# Patient Record
Sex: MALE | Race: ASIAN | NOT HISPANIC OR LATINO | URBAN - METROPOLITAN AREA
[De-identification: names, ages, dates, MRNs, and addresses within clinical notes are randomized per-mention and may not be internally consistent; named-entity substitution may affect disease eponyms.]

---

## 2017-04-12 ENCOUNTER — INPATIENT (INPATIENT)
Facility: HOSPITAL | Age: 57
LOS: 0 days | Discharge: HOME | DRG: 313 | End: 2017-04-13
Attending: SPECIALIST | Admitting: SPECIALIST
Payer: COMMERCIAL

## 2017-04-12 VITALS
TEMPERATURE: 98 F | WEIGHT: 149.91 LBS | HEART RATE: 70 BPM | RESPIRATION RATE: 18 BRPM | SYSTOLIC BLOOD PRESSURE: 143 MMHG | OXYGEN SATURATION: 98 % | HEIGHT: 67 IN | DIASTOLIC BLOOD PRESSURE: 84 MMHG

## 2017-04-12 DIAGNOSIS — I10 ESSENTIAL (PRIMARY) HYPERTENSION: ICD-10-CM

## 2017-04-12 DIAGNOSIS — E11.9 TYPE 2 DIABETES MELLITUS WITHOUT COMPLICATIONS: ICD-10-CM

## 2017-04-12 DIAGNOSIS — R07.89 OTHER CHEST PAIN: ICD-10-CM

## 2017-04-12 LAB
ALBUMIN SERPL ELPH-MCNC: 4 G/DL — SIGNIFICANT CHANGE UP (ref 3.4–5)
ALP SERPL-CCNC: 77 U/L — SIGNIFICANT CHANGE UP (ref 40–120)
ALT FLD-CCNC: 46 U/L — HIGH (ref 12–42)
ANION GAP SERPL CALC-SCNC: 10 MMOL/L — SIGNIFICANT CHANGE UP (ref 9–16)
APTT BLD: 34.8 SEC — SIGNIFICANT CHANGE UP (ref 27.5–37.4)
AST SERPL-CCNC: 21 U/L — SIGNIFICANT CHANGE UP (ref 15–37)
BASOPHILS NFR BLD AUTO: 0.5 % — SIGNIFICANT CHANGE UP (ref 0–2)
BILIRUB SERPL-MCNC: 0.7 MG/DL — SIGNIFICANT CHANGE UP (ref 0.2–1.2)
BUN SERPL-MCNC: 19 MG/DL — SIGNIFICANT CHANGE UP (ref 7–23)
CALCIUM SERPL-MCNC: 8.4 MG/DL — LOW (ref 8.5–10.5)
CHLORIDE SERPL-SCNC: 104 MMOL/L — SIGNIFICANT CHANGE UP (ref 96–108)
CK MB CFR SERPL CALC: 1.1 NG/ML — SIGNIFICANT CHANGE UP (ref 0.5–3.6)
CK SERPL-CCNC: 166 U/L — SIGNIFICANT CHANGE UP (ref 39–308)
CO2 SERPL-SCNC: 25 MMOL/L — SIGNIFICANT CHANGE UP (ref 22–31)
CREAT SERPL-MCNC: 0.87 MG/DL — SIGNIFICANT CHANGE UP (ref 0.5–1.3)
EOSINOPHIL NFR BLD AUTO: 1.9 % — SIGNIFICANT CHANGE UP (ref 0–6)
GLUCOSE SERPL-MCNC: 135 MG/DL — HIGH (ref 70–99)
HCT VFR BLD CALC: 47 % — SIGNIFICANT CHANGE UP (ref 39–50)
HGB BLD-MCNC: 16.1 G/DL — SIGNIFICANT CHANGE UP (ref 13–17)
INR BLD: 0.95 — SIGNIFICANT CHANGE UP (ref 0.88–1.16)
LYMPHOCYTES # BLD AUTO: 32.3 % — SIGNIFICANT CHANGE UP (ref 13–44)
MCHC RBC-ENTMCNC: 28.8 PG — SIGNIFICANT CHANGE UP (ref 27–34)
MCHC RBC-ENTMCNC: 34.3 G/DL — SIGNIFICANT CHANGE UP (ref 32–36)
MCV RBC AUTO: 83.9 FL — SIGNIFICANT CHANGE UP (ref 80–100)
MONOCYTES NFR BLD AUTO: 6.1 % — SIGNIFICANT CHANGE UP (ref 2–14)
NEUTROPHILS NFR BLD AUTO: 59.2 % — SIGNIFICANT CHANGE UP (ref 43–77)
PLATELET # BLD AUTO: 194 K/UL — SIGNIFICANT CHANGE UP (ref 150–400)
POTASSIUM SERPL-MCNC: 3.7 MMOL/L — SIGNIFICANT CHANGE UP (ref 3.5–5.3)
POTASSIUM SERPL-SCNC: 3.7 MMOL/L — SIGNIFICANT CHANGE UP (ref 3.5–5.3)
PROT SERPL-MCNC: 7.6 G/DL — SIGNIFICANT CHANGE UP (ref 6.4–8.2)
PROTHROM AB SERPL-ACNC: 10.5 SEC — SIGNIFICANT CHANGE UP (ref 9.8–12.7)
RBC # BLD: 5.6 M/UL — SIGNIFICANT CHANGE UP (ref 4.2–5.8)
RBC # FLD: 13.5 % — SIGNIFICANT CHANGE UP (ref 10.3–16.9)
SODIUM SERPL-SCNC: 139 MMOL/L — SIGNIFICANT CHANGE UP (ref 135–145)
TROPONIN I SERPL-MCNC: <0.015 NG/ML — SIGNIFICANT CHANGE UP (ref 0.01–0.04)
WBC # BLD: 10.1 K/UL — SIGNIFICANT CHANGE UP (ref 3.8–10.5)
WBC # FLD AUTO: 10.1 K/UL — SIGNIFICANT CHANGE UP (ref 3.8–10.5)

## 2017-04-12 PROCEDURE — 93010 ELECTROCARDIOGRAM REPORT: CPT | Mod: NC

## 2017-04-12 PROCEDURE — 99284 EMERGENCY DEPT VISIT MOD MDM: CPT | Mod: 25

## 2017-04-12 PROCEDURE — 71020: CPT | Mod: 26

## 2017-04-12 RX ORDER — DEXTROSE 50 % IN WATER 50 %
12.5 SYRINGE (ML) INTRAVENOUS ONCE
Qty: 0 | Refills: 0 | Status: DISCONTINUED | OUTPATIENT
Start: 2017-04-12 | End: 2017-04-13

## 2017-04-12 RX ORDER — NEBIVOLOL HYDROCHLORIDE 5 MG/1
1 TABLET ORAL
Qty: 0 | Refills: 0 | COMMUNITY

## 2017-04-12 RX ORDER — SODIUM CHLORIDE 9 MG/ML
1000 INJECTION INTRAMUSCULAR; INTRAVENOUS; SUBCUTANEOUS
Qty: 0 | Refills: 0 | Status: DISCONTINUED | OUTPATIENT
Start: 2017-04-13 | End: 2017-04-13

## 2017-04-12 RX ORDER — INSULIN LISPRO 100/ML
VIAL (ML) SUBCUTANEOUS
Qty: 0 | Refills: 0 | Status: DISCONTINUED | OUTPATIENT
Start: 2017-04-12 | End: 2017-04-13

## 2017-04-12 RX ORDER — ASPIRIN/CALCIUM CARB/MAGNESIUM 324 MG
81 TABLET ORAL ONCE
Qty: 0 | Refills: 0 | Status: COMPLETED | OUTPATIENT
Start: 2017-04-13 | End: 2017-04-13

## 2017-04-12 RX ORDER — EMPAGLIFLOZIN AND LINAGLIPTIN 10; 5 MG/1; MG/1
1 TABLET, FILM COATED ORAL
Qty: 0 | Refills: 0 | COMMUNITY

## 2017-04-12 RX ORDER — SODIUM CHLORIDE 9 MG/ML
1000 INJECTION, SOLUTION INTRAVENOUS
Qty: 0 | Refills: 0 | Status: DISCONTINUED | OUTPATIENT
Start: 2017-04-12 | End: 2017-04-13

## 2017-04-12 RX ORDER — DEXTROSE 50 % IN WATER 50 %
25 SYRINGE (ML) INTRAVENOUS ONCE
Qty: 0 | Refills: 0 | Status: DISCONTINUED | OUTPATIENT
Start: 2017-04-12 | End: 2017-04-13

## 2017-04-12 RX ORDER — METFORMIN HYDROCHLORIDE 850 MG/1
1 TABLET ORAL
Qty: 0 | Refills: 0 | COMMUNITY

## 2017-04-12 RX ORDER — DEXTROSE 50 % IN WATER 50 %
1 SYRINGE (ML) INTRAVENOUS ONCE
Qty: 0 | Refills: 0 | Status: DISCONTINUED | OUTPATIENT
Start: 2017-04-12 | End: 2017-04-13

## 2017-04-12 RX ORDER — GLUCAGON INJECTION, SOLUTION 0.5 MG/.1ML
1 INJECTION, SOLUTION SUBCUTANEOUS ONCE
Qty: 0 | Refills: 0 | Status: DISCONTINUED | OUTPATIENT
Start: 2017-04-12 | End: 2017-04-13

## 2017-04-12 RX ORDER — ASPIRIN/CALCIUM CARB/MAGNESIUM 324 MG
162 TABLET ORAL DAILY
Qty: 0 | Refills: 0 | Status: DISCONTINUED | OUTPATIENT
Start: 2017-04-12 | End: 2017-04-12

## 2017-04-12 RX ORDER — NEBIVOLOL HYDROCHLORIDE 5 MG/1
10 TABLET ORAL DAILY
Qty: 0 | Refills: 0 | Status: DISCONTINUED | OUTPATIENT
Start: 2017-04-12 | End: 2017-04-13

## 2017-04-12 RX ADMIN — Medication 162 MILLIGRAM(S): at 18:13

## 2017-04-12 NOTE — H&P ADULT - PROBLEM SELECTOR PLAN 1
Follow-up CE at 10pm and 2am.   NPO after midnight for CTA Coronaries.   Echo ordered.   Follow-up TSH and lipid panel.   ASA 81 mg tomorrow AM prior to CTA.

## 2017-04-12 NOTE — ED PROVIDER NOTE - FAMILY HISTORY
Father  Still living? Unknown  Family history of stroke, Age at diagnosis: Age Unknown     Sibling  Still living? Unknown  Family history of stroke, Age at diagnosis: Age Unknown

## 2017-04-12 NOTE — H&P ADULT - NSHPLABSRESULTS_GEN_ALL_CORE
16.1   10.1  )-----------( 194      ( 12 Apr 2017 17:04 )             47.0   04-12    139  |  104  |  19  ----------------------------<  135<H>  3.7   |  25  |  0.87    Ca    8.4<L>      12 Apr 2017 17:04    TPro  7.6  /  Alb  4.0  /  TBili  0.7  /  DBili  x   /  AST  21  /  ALT  46<H>  /  AlkPhos  77  04-12    CARDIAC MARKERS ( 12 Apr 2017 17:04 )  <0.015 ng/mL / x     / 166 U/L / x     / 1.1 ng/mL  PT/INR - ( 12 Apr 2017 17:04 )   PT: 10.5 sec;   INR: 0.95          PTT - ( 12 Apr 2017 17:04 )  PTT:34.8 sec

## 2017-04-12 NOTE — H&P ADULT - NSHPPHYSICALEXAM_GEN_ALL_CORE
VS: /84, HR 70, RR 18, T 98.2, SpO2 98% RA	    GEN: NAD  PULM:  CTA B/L, No Crackles, Wheezes or Rhonchi  CARD: No JVD B/L, RRR, S1 and S2   ABD: +BS, NT, soft/ND	  EXT: No Edema B/L LE  NEURO: A+Ox3, no focal deficit

## 2017-04-12 NOTE — H&P ADULT - PROBLEM SELECTOR PLAN 3
Hold Metformin.   JAMI.   Hemoglobin A1C.     DVT ppx  Intermittent pneumatic compression device.     Dispo: NPO after midnight for CTA Coronaries in the AM.

## 2017-04-12 NOTE — H&P ADULT - NSHPREVIEWOFSYSTEMS_GEN_ALL_CORE
GEN Denies fever, chills, sweats, weakness/fatigue  SKIN Denies rash, pruritis, lesions/sores   HEENT Denies HA, dizziness, lightheaded, vertigo, vision changes, hearing changes  LUNG Denies cough, sputum, wheeze, hemoptysis, pleurisy  CV  +C/P, Denies MAN, palpitations, orthopnea, PND, claudication, syncope  GI Denies abdominal pain, dysphagia, GERD, N/V/D, constipation, melena, BRBPR, hematemesis   URINARY Denies polyuria, dysuria, hematuria, 		  MSK Denies joint pains, joint swelling, falls, gait abnl, limited ROM  NEUROLOGY Denies fainting, seizures, weakness, numbness/tingling, tremor, speech changes  PSYCH Denies depression, anxiety, hallucinations, suicidal ideation

## 2017-04-12 NOTE — H&P ADULT - ASSESSMENT
57 y/o M with PMHx of HTN, DM, PUD (w/ history of GI bleed 12 years ago treated with EGD, no history of GI bleeds since) who presented to [facility] ER 4/12/17 with atypical chest pain x 1 week. Patient is currently asymptomatic with VSS. Labs significant for CE negative x 1. Pt received ASA 162mg in ER. Pt admitted to [facility] for further management.

## 2017-04-12 NOTE — ED ADULT NURSE NOTE - CHPI ED SYMPTOMS NEG
no syncope/no shortness of breath/no vomiting/no diaphoresis/no fever/no chills/no back pain/abd pain/no cough/no dizziness/no nausea

## 2017-04-12 NOTE — ED PROVIDER NOTE - PMH
Essential hypertension    Type 2 diabetes mellitus without complication, without long-term current use of insulin

## 2017-04-12 NOTE — ED PROVIDER NOTE - OBJECTIVE STATEMENT
57 yo M with PMHx of HTN and DMII presents with one week of chest pain. Pt states that he has been having a dull pain near his left armpit, that comes and goes, for the past week. Pt states that the pain is 2-3-10 on VAS. Pt states that he has only experienced a similar pain, once last year, but it was only one time and then it went away. Pt states that he went to see his PCP today and she sent him to the [facility] ED. Pt denies any n/v/f/c/SOB/dizziness/HA/ visual changes.

## 2017-04-12 NOTE — CONSULT NOTE ADULT - SUBJECTIVE AND OBJECTIVE BOX
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

## 2017-04-12 NOTE — H&P ADULT - HISTORY OF PRESENT ILLNESS
55 y/o M with PMHx of HTN, DM, PUD (w/ history of GI bleed 12 years ago treated with EGD, no history of GI bleeds since) who presented to [facility] ER 4/12/17 with atypical chest pain x 1 week. Pt states that last Tuesday 4/4/17 he woke up from sleep at 3am with non-radiating left sided axillary pain 2-3/10 in severity that lasted for a few minutes. The next morning the patient went to work and experienced the same pain around 11am which he describes as "pressure like someone is holding his chest".  On Monday, patient reported intermittent, non-radiating left-sided chest pain. Patient was sent from PMD to [facility] ER. Patient is currently asymptomatic with VSS /84, HR 70, RR 18, T 98.2, SpO2 98% RA. Pt denies SOB, dizziness, diaphoresis, fatigue, LE edema, palpitations, orthopnea, PND, syncope. Labs significant for CE negative x 1. EKG showed NSR at 72 bpm, no ST changes, TW flattening in aVL, no TWI. CXR showed clear lungs. Pt received ASA 162mg in ER. Pt admitted to [facility] for further management.

## 2017-04-12 NOTE — ED PROVIDER NOTE - MEDICAL DECISION MAKING DETAILS
57 yo M with PMHx of HTN and DMII presents with one week of chest pain. Likely musculoskeletal in nature. EKG wnl. Unlikely acute MI/ PE. VSS. F/u labs. 57 yo M with PMHx of HTN and DMII presents with one week of chest pain. Likely musculoskeletal in nature. EKG wnl. Unlikely acute MI/ PE. VSS. Labs wnl. Seen by his cardiologist Dr. Beckham in ED, will admit and have CT coronary in AM, given age and comorbidities.

## 2017-04-12 NOTE — ED ADULT TRIAGE NOTE - CHIEF COMPLAINT QUOTE
sent in by PMD for evaluation on angina, pt c/o of episodes of chest tightness throughout  the last week, denies cp at this time. ekg in progress

## 2017-04-13 ENCOUNTER — TRANSCRIPTION ENCOUNTER (OUTPATIENT)
Age: 57
End: 2017-04-13

## 2017-04-13 VITALS
OXYGEN SATURATION: 98 % | SYSTOLIC BLOOD PRESSURE: 118 MMHG | HEART RATE: 68 BPM | DIASTOLIC BLOOD PRESSURE: 73 MMHG | RESPIRATION RATE: 17 BRPM

## 2017-04-13 LAB
ANION GAP SERPL CALC-SCNC: 7 MMOL/L — LOW (ref 9–16)
BUN SERPL-MCNC: 22 MG/DL — SIGNIFICANT CHANGE UP (ref 7–23)
CALCIUM SERPL-MCNC: 8.2 MG/DL — LOW (ref 8.5–10.5)
CHLORIDE SERPL-SCNC: 106 MMOL/L — SIGNIFICANT CHANGE UP (ref 96–108)
CK MB CFR SERPL CALC: <1 NG/ML — SIGNIFICANT CHANGE UP (ref 0.5–3.6)
CK SERPL-CCNC: 127 U/L — SIGNIFICANT CHANGE UP (ref 39–308)
CO2 SERPL-SCNC: 27 MMOL/L — SIGNIFICANT CHANGE UP (ref 22–31)
CREAT SERPL-MCNC: 0.83 MG/DL — SIGNIFICANT CHANGE UP (ref 0.5–1.3)
GLUCOSE SERPL-MCNC: 165 MG/DL — HIGH (ref 70–99)
HBA1C BLD-MCNC: 7.7 % — HIGH (ref 4.8–5.6)
HCT VFR BLD CALC: 43.2 % — SIGNIFICANT CHANGE UP (ref 39–50)
HGB BLD-MCNC: 14.7 G/DL — SIGNIFICANT CHANGE UP (ref 13–17)
MAGNESIUM SERPL-MCNC: 2.3 MG/DL — SIGNIFICANT CHANGE UP (ref 1.6–2.4)
MCHC RBC-ENTMCNC: 29 PG — SIGNIFICANT CHANGE UP (ref 27–34)
MCHC RBC-ENTMCNC: 34 G/DL — SIGNIFICANT CHANGE UP (ref 32–36)
MCV RBC AUTO: 85.2 FL — SIGNIFICANT CHANGE UP (ref 80–100)
PLATELET # BLD AUTO: 185 K/UL — SIGNIFICANT CHANGE UP (ref 150–400)
POTASSIUM SERPL-MCNC: 3.1 MMOL/L — LOW (ref 3.5–5.3)
POTASSIUM SERPL-MCNC: 4 MMOL/L — SIGNIFICANT CHANGE UP (ref 3.5–5.3)
POTASSIUM SERPL-SCNC: 3.1 MMOL/L — LOW (ref 3.5–5.3)
POTASSIUM SERPL-SCNC: 4 MMOL/L — SIGNIFICANT CHANGE UP (ref 3.5–5.3)
RBC # BLD: 5.07 M/UL — SIGNIFICANT CHANGE UP (ref 4.2–5.8)
RBC # FLD: 13.7 % — SIGNIFICANT CHANGE UP (ref 10.3–16.9)
SODIUM SERPL-SCNC: 140 MMOL/L — SIGNIFICANT CHANGE UP (ref 135–145)
TROPONIN I SERPL-MCNC: <0.015 NG/ML — SIGNIFICANT CHANGE UP (ref 0.01–0.04)
TSH SERPL-MCNC: 1.27 UIU/ML — SIGNIFICANT CHANGE UP (ref 0.35–4.94)
WBC # BLD: 8.8 K/UL — SIGNIFICANT CHANGE UP (ref 3.8–10.5)
WBC # FLD AUTO: 8.8 K/UL — SIGNIFICANT CHANGE UP (ref 3.8–10.5)

## 2017-04-13 PROCEDURE — 85610 PROTHROMBIN TIME: CPT

## 2017-04-13 PROCEDURE — G0378: CPT

## 2017-04-13 PROCEDURE — 85025 COMPLETE CBC W/AUTO DIFF WBC: CPT

## 2017-04-13 PROCEDURE — 36415 COLL VENOUS BLD VENIPUNCTURE: CPT

## 2017-04-13 PROCEDURE — 93005 ELECTROCARDIOGRAM TRACING: CPT

## 2017-04-13 PROCEDURE — 84484 ASSAY OF TROPONIN QUANT: CPT

## 2017-04-13 PROCEDURE — 93306 TTE W/DOPPLER COMPLETE: CPT | Mod: 26

## 2017-04-13 PROCEDURE — 93306 TTE W/DOPPLER COMPLETE: CPT

## 2017-04-13 PROCEDURE — 71046 X-RAY EXAM CHEST 2 VIEWS: CPT

## 2017-04-13 PROCEDURE — 85027 COMPLETE CBC AUTOMATED: CPT

## 2017-04-13 PROCEDURE — 84443 ASSAY THYROID STIM HORMONE: CPT

## 2017-04-13 PROCEDURE — 84132 ASSAY OF SERUM POTASSIUM: CPT

## 2017-04-13 PROCEDURE — 83735 ASSAY OF MAGNESIUM: CPT

## 2017-04-13 PROCEDURE — 99285 EMERGENCY DEPT VISIT HI MDM: CPT | Mod: 25

## 2017-04-13 PROCEDURE — 80053 COMPREHEN METABOLIC PANEL: CPT

## 2017-04-13 PROCEDURE — 82550 ASSAY OF CK (CPK): CPT

## 2017-04-13 PROCEDURE — 80048 BASIC METABOLIC PNL TOTAL CA: CPT

## 2017-04-13 PROCEDURE — 83036 HEMOGLOBIN GLYCOSYLATED A1C: CPT

## 2017-04-13 PROCEDURE — 82553 CREATINE MB FRACTION: CPT

## 2017-04-13 PROCEDURE — 75574 CT ANGIO HRT W/3D IMAGE: CPT | Mod: 26

## 2017-04-13 PROCEDURE — 75574 CT ANGIO HRT W/3D IMAGE: CPT

## 2017-04-13 PROCEDURE — 80061 LIPID PANEL: CPT

## 2017-04-13 PROCEDURE — 85730 THROMBOPLASTIN TIME PARTIAL: CPT

## 2017-04-13 RX ORDER — POTASSIUM CHLORIDE 20 MEQ
10 PACKET (EA) ORAL ONCE
Qty: 0 | Refills: 0 | Status: COMPLETED | OUTPATIENT
Start: 2017-04-13 | End: 2017-04-13

## 2017-04-13 RX ORDER — POTASSIUM CHLORIDE 20 MEQ
20 PACKET (EA) ORAL ONCE
Qty: 0 | Refills: 0 | Status: COMPLETED | OUTPATIENT
Start: 2017-04-13 | End: 2017-04-13

## 2017-04-13 RX ORDER — POTASSIUM CHLORIDE 20 MEQ
40 PACKET (EA) ORAL ONCE
Qty: 0 | Refills: 0 | Status: COMPLETED | OUTPATIENT
Start: 2017-04-13 | End: 2017-04-13

## 2017-04-13 RX ADMIN — Medication 81 MILLIGRAM(S): at 05:51

## 2017-04-13 RX ADMIN — SODIUM CHLORIDE 50 MILLILITER(S): 9 INJECTION INTRAMUSCULAR; INTRAVENOUS; SUBCUTANEOUS at 07:01

## 2017-04-13 RX ADMIN — Medication 40 MILLIEQUIVALENT(S): at 11:26

## 2017-04-13 RX ADMIN — Medication 20 MILLIEQUIVALENT(S): at 11:25

## 2017-04-13 RX ADMIN — NEBIVOLOL HYDROCHLORIDE 10 MILLIGRAM(S): 5 TABLET ORAL at 05:51

## 2017-04-13 NOTE — DISCHARGE NOTE ADULT - MEDICATION SUMMARY - MEDICATIONS TO TAKE
I will START or STAY ON the medications listed below when I get home from the hospital:    metFORMIN 1000 mg oral tablet  -- 1 tab(s) by mouth 2 times a day  -- Indication: For diabetes    Glyxambi 25 mg-5 mg oral tablet  -- 1 tab(s) by mouth once a day (in the morning)  -- Indication: For diabetes    Bystolic 10 mg oral tablet  -- 1 tab(s) by mouth once a day  -- Indication: For Hypertension

## 2017-04-13 NOTE — DISCHARGE NOTE ADULT - CARE PROVIDER_API CALL
Sheridan Beckham), Cardiovascular Disease; Internal Medicine  [address] [place] [address]  Phone: [phone]  Fax: [phone]

## 2017-04-13 NOTE — DISCHARGE NOTE ADULT - HOSPITAL COURSE
55 y/o M with PMHx of HTN, DM, PUD (w/ history of GI bleed 12 years ago treated with EGD, no history of GI bleeds since) who presented to [facility] ER 4/12/17 with atypical chest pain x 1 week. Pt states that last Tuesday 4/4/17 he woke up from sleep at 3am with non-radiating left sided axillary pain 2-3/10 in severity that lasted for a few minutes. The next morning the patient went to work and experienced the same pain around 11am which he describes as "pressure like someone is holding his chest".  On Monday, patient reported intermittent, non-radiating left-sided chest pain. Patient was sent from PMD to [facility] ER. Patient is currently asymptomatic with VSS /84, HR 70, RR 18, T 98.2, SpO2 98% RA. Pt denies SOB, dizziness, diaphoresis, fatigue, LE edema, palpitations, orthopnea, PND, syncope. Labs significant for CE negative x 1. EKG showed NSR at 72 bpm, no ST changes, TW flattening in aVL, no TWI. CXR showed clear lungs. Pt received ASA 162mg in ER. Pt admitted to [facility] for further management.  Pt is currently CP free and HD stable with Trops negative x 2 sets.  ECHO 4/13: Normal left ventricular size and wall thickness. The left ventricular wall  motion is normal. The left ventricular ejection fraction is estimated to be  55-60%.  The left atrial size is normal. The mitral inflow pattern is  consistent with impaired left ventricular relaxation with mildly  elevated left  atrial pressure (8-14mmHg).  Lipomatous hypertrophy of the interatrial  septum is noted (benign condition).  CTA Cardiac 4/13: LVEF 66 %.   Zero calcium score.  Normal LM.  Mild stenosis in the proximal LAD due to noncalcific plaque.  Normal LCX and RCA.  Normal left ventricular systolic function.   Pt was found with Hypokalemia (K+ 3.1) and was repleted with Kdur 60 meq total with repeat Potassium 4.0. VSS.  labs and medications discussed with Dr. Beckham and pt is deemed stable for discharge to home this evening. Pt is to continue his home medications as prescribed and follow up with Dr. Beckham in 1-2 weeks for post discharge check-up.

## 2017-04-13 NOTE — DISCHARGE NOTE ADULT - CARE PLAN
Principal Discharge DX:	Atypical chest pain  Goal:	You ruled out for acute coronary syndrome and is currently Chest Pain  Instructions for follow-up, activity and diet:	You underwent an ECHO 4/13: Normal left ventricular size and wall thickness. The left ventricular wall  motion is normal. The left ventricular ejection fraction is estimated to be  55-60%.  The left atrial size is normal. The mitral inflow pattern is  consistent with impaired left ventricular relaxation with mildly  elevated left  atrial pressure (8-14mmHg).  Lipomatous hypertrophy of the interatrial  septum is noted (benign condition).  CTA Cardiac 4/13: LVEF 66 %.   Zero calcium score.  Normal LM.  Mild stenosis in the proximal LAD due to noncalcific plaque.  Normal LCX and RCA.  Normal left ventricular systolic function.Please follow up with Dr. Beckham in 1-2 weeks  Secondary Diagnosis:	Essential hypertension  Goal:	Continue Nebivolol 10mg daily  Instructions for follow-up, activity and diet:	You have a history of elevated blood pressure and you should continue your blood pressure medications as prescribed.  Secondary Diagnosis:	Type 2 diabetes mellitus without complication, without long-term current use of insulin  Goal:	Your Hemaglobin A1c is 6.7.  Instructions for follow-up, activity and diet:	You have a diagnosis of diabetes and should continue all of your diabetic medications as prescribed. Please do not take your metformin for 2 days to prevent interaction with the contrast you received.

## 2017-04-13 NOTE — DISCHARGE NOTE ADULT - PLAN OF CARE
Continue Nebivolol 10mg daily You ruled out for acute coronary syndrome and is currently Chest Pain You underwent an ECHO 4/13: Normal left ventricular size and wall thickness. The left ventricular wall  motion is normal. The left ventricular ejection fraction is estimated to be  55-60%.  The left atrial size is normal. The mitral inflow pattern is  consistent with impaired left ventricular relaxation with mildly  elevated left  atrial pressure (8-14mmHg).  Lipomatous hypertrophy of the interatrial  septum is noted (benign condition).  CTA Cardiac 4/13: LVEF 66 %.   Zero calcium score.  Normal LM.  Mild stenosis in the proximal LAD due to noncalcific plaque.  Normal LCX and RCA.  Normal left ventricular systolic function.Please follow up with Dr. Beckham in 1-2 weeks You have a history of elevated blood pressure and you should continue your blood pressure medications as prescribed. Your Hemaglobin A1c is 6.7. You have a diagnosis of diabetes and should continue all of your diabetic medications as prescribed. Please do not take your metformin for 2 days to prevent interaction with the contrast you received.

## 2017-04-13 NOTE — PROGRESS NOTE ADULT - SUBJECTIVE AND OBJECTIVE BOX
patient seen at 7 am this morning was free of chest pain  serum K of 3.1 mEqu was corrected  had ct oronary angiogram and echocardiogram the echocardiogram revealed normal LV and RV function and normal valular function, normal pulmonaary artery pressure  The CT showed no coronary calcification and mild narrowing of proximal LAD   Will be discharged on his home medications and Ecotrin 81 mg   To be followed as outpatient by Dr Martinez Consider adding statin to his regimen even though his cholesterol is LDL 87 HDL 50 if impression of   non calcific plaque in proximal LAD is confirmed in discussion with attendings reading the report

## 2017-04-17 DIAGNOSIS — R07.89 OTHER CHEST PAIN: ICD-10-CM

## 2017-04-17 DIAGNOSIS — R07.9 CHEST PAIN, UNSPECIFIED: ICD-10-CM

## 2017-04-17 DIAGNOSIS — Z79.84 LONG TERM (CURRENT) USE OF ORAL HYPOGLYCEMIC DRUGS: ICD-10-CM

## 2017-04-17 DIAGNOSIS — E11.9 TYPE 2 DIABETES MELLITUS WITHOUT COMPLICATIONS: ICD-10-CM

## 2017-04-17 DIAGNOSIS — I10 ESSENTIAL (PRIMARY) HYPERTENSION: ICD-10-CM

## 2017-04-17 DIAGNOSIS — Z87.11 PERSONAL HISTORY OF PEPTIC ULCER DISEASE: ICD-10-CM

## 2017-04-17 DIAGNOSIS — Z82.3 FAMILY HISTORY OF STROKE: ICD-10-CM

## 2017-04-17 DIAGNOSIS — E87.6 HYPOKALEMIA: ICD-10-CM

## 2020-04-25 ENCOUNTER — MESSAGE (OUTPATIENT)
Age: 60
End: 2020-04-25

## 2024-09-20 ENCOUNTER — APPOINTMENT (OUTPATIENT)
Dept: INTERNAL MEDICINE | Facility: CLINIC | Age: 64
End: 2024-09-20